# Patient Record
Sex: MALE | Race: WHITE | NOT HISPANIC OR LATINO
[De-identification: names, ages, dates, MRNs, and addresses within clinical notes are randomized per-mention and may not be internally consistent; named-entity substitution may affect disease eponyms.]

---

## 2017-03-03 ENCOUNTER — PRIOR ORIGINAL RECORDS (OUTPATIENT)
Dept: OTHER | Age: 72
End: 2017-03-03

## 2017-03-03 ENCOUNTER — HOSPITAL ENCOUNTER (OUTPATIENT)
Dept: LAB | Facility: HOSPITAL | Age: 72
Discharge: HOME | End: 2017-03-03
Attending: INTERNAL MEDICINE
Payer: MEDICARE

## 2017-03-03 LAB
ALBUMIN SERPL-MCNC: 3.6 G/DL (ref 3.5–4.8)
ALP LIVER SERPL-CCNC: 75 U/L (ref 45–117)
ALT SERPL-CCNC: 19 U/L (ref 17–63)
AST SERPL-CCNC: 17 U/L (ref 15–41)
BILIRUB SERPL-MCNC: 0.9 MG/DL (ref 0.1–2)
BUN BLD-MCNC: 18 MG/DL (ref 8–20)
CALCIUM BLD-MCNC: 9.2 MG/DL (ref 8.3–10.3)
CHLORIDE: 106 MMOL/L (ref 101–111)
CHOLEST SMN-MCNC: 149 MG/DL (ref ?–200)
CO2: 30 MMOL/L (ref 22–32)
CREAT BLD-MCNC: 1.05 MG/DL (ref 0.7–1.3)
GLUCOSE BLD-MCNC: 85 MG/DL (ref 70–99)
HDLC SERPL-MCNC: 74 MG/DL (ref 45–?)
HDLC SERPL: 2.01 {RATIO} (ref ?–4.97)
LDLC SERPL CALC-MCNC: 53 MG/DL (ref ?–130)
M PROTEIN MFR SERPL ELPH: 6.8 G/DL (ref 6.1–8.3)
NONHDLC SERPL-MCNC: 75 MG/DL (ref ?–130)
POTASSIUM SERPL-SCNC: 4.3 MMOL/L (ref 3.6–5.1)
SODIUM SERPL-SCNC: 143 MMOL/L (ref 136–144)
TRIGLYCERIDES: 110 MG/DL (ref ?–150)
VLDL: 22 MG/DL (ref 5–40)

## 2017-03-03 PROCEDURE — 80053 COMPREHEN METABOLIC PANEL: CPT | Performed by: INTERNAL MEDICINE

## 2017-03-03 PROCEDURE — 36415 COLL VENOUS BLD VENIPUNCTURE: CPT | Performed by: INTERNAL MEDICINE

## 2017-03-03 PROCEDURE — 80061 LIPID PANEL: CPT | Performed by: INTERNAL MEDICINE

## 2017-03-07 LAB
ALBUMIN: 3.6 G/DL
ALKALINE PHOSPHATATE(ALK PHOS): 75 IU/L
BILIRUBIN TOTAL: 0.9 MG/DL
BUN: 18 MG/DL
CALCIUM: 9.2 MG/DL
CHLORIDE: 106 MEQ/L
CHOLESTEROL, TOTAL: 149 MG/DL
CREATININE, SERUM: 1.05 MG/DL
GLUCOSE: 85 MG/DL
HDL CHOLESTEROL: 74 MG/DL
LDL CHOLESTEROL: 53 MG/DL
POTASSIUM, SERUM: 4.3 MEQ/L
PROTEIN, TOTAL: 6.8 G/DL
SGOT (AST): 17 IU/L
SGPT (ALT): 19 IU/L
SODIUM: 143 MEQ/L
TRIGLYCERIDES: 110 MG/DL

## 2017-03-08 ENCOUNTER — MYAURORA ACCOUNT LINK (OUTPATIENT)
Dept: OTHER | Age: 72
End: 2017-03-08

## 2017-03-08 ENCOUNTER — PRIOR ORIGINAL RECORDS (OUTPATIENT)
Dept: OTHER | Age: 72
End: 2017-03-08

## 2018-02-20 PROCEDURE — 88305 TISSUE EXAM BY PATHOLOGIST: CPT | Performed by: INTERNAL MEDICINE

## 2018-03-08 ENCOUNTER — PRIOR ORIGINAL RECORDS (OUTPATIENT)
Dept: OTHER | Age: 73
End: 2018-03-08

## 2018-04-26 ENCOUNTER — PRIOR ORIGINAL RECORDS (OUTPATIENT)
Dept: OTHER | Age: 73
End: 2018-04-26

## 2018-04-26 ENCOUNTER — MYAURORA ACCOUNT LINK (OUTPATIENT)
Dept: OTHER | Age: 73
End: 2018-04-26

## 2018-05-07 LAB
ALBUMIN: 4.3 G/DL
ALKALINE PHOSPHATATE(ALK PHOS): 78 IU/L
BILIRUBIN TOTAL: 0.8 MG/DL
BUN: 20 MG/DL
CALCIUM: 9.5 MG/DL
CHLORIDE: 100 MEQ/L
CHOLESTEROL, TOTAL: 184 MG/DL
CREATININE, SERUM: 1 MG/DL
GLUCOSE: 95 MG/DL
HDL CHOLESTEROL: 65 MG/DL
LDL CHOLESTEROL: 99 MG/DL
POTASSIUM, SERUM: 4.8 MEQ/L
PROTEIN, TOTAL: 6.6 G/DL
SGOT (AST): 24 IU/L
SGPT (ALT): 18 IU/L
SODIUM: 140 MEQ/L
TRIGLYCERIDES: 102 MG/DL

## 2018-10-23 ENCOUNTER — HOSPITAL ENCOUNTER (OUTPATIENT)
Dept: LAB | Facility: HOSPITAL | Age: 73
Discharge: HOME | End: 2018-10-23
Attending: INTERNAL MEDICINE
Payer: MEDICARE

## 2018-10-23 ENCOUNTER — PRIOR ORIGINAL RECORDS (OUTPATIENT)
Dept: OTHER | Age: 73
End: 2018-10-23

## 2018-10-23 PROCEDURE — 80061 LIPID PANEL: CPT | Performed by: INTERNAL MEDICINE

## 2018-10-23 PROCEDURE — 36415 COLL VENOUS BLD VENIPUNCTURE: CPT | Performed by: INTERNAL MEDICINE

## 2018-10-24 ENCOUNTER — PRIOR ORIGINAL RECORDS (OUTPATIENT)
Dept: OTHER | Age: 73
End: 2018-10-24

## 2018-10-24 LAB
CHOLESTEROL, TOTAL: 164 MG/DL
HDL CHOLESTEROL: 62 MG/DL
LDL CHOLESTEROL: 80 MG/DL
TRIGLYCERIDES: 112 MG/DL

## 2019-02-28 VITALS
SYSTOLIC BLOOD PRESSURE: 118 MMHG | WEIGHT: 165 LBS | HEART RATE: 74 BPM | DIASTOLIC BLOOD PRESSURE: 68 MMHG | BODY MASS INDEX: 22.35 KG/M2 | HEIGHT: 72 IN

## 2019-03-01 VITALS
DIASTOLIC BLOOD PRESSURE: 62 MMHG | HEIGHT: 71 IN | SYSTOLIC BLOOD PRESSURE: 108 MMHG | WEIGHT: 166 LBS | HEART RATE: 72 BPM | BODY MASS INDEX: 23.24 KG/M2

## 2019-03-18 RX ORDER — ATORVASTATIN CALCIUM 40 MG/1
TABLET, FILM COATED ORAL
COMMUNITY
Start: 2018-04-26 | End: 2019-05-06 | Stop reason: SDUPTHER

## 2019-03-18 RX ORDER — PHENOL 1.4 %
1 AEROSOL, SPRAY (ML) MUCOUS MEMBRANE 2 TIMES DAILY
COMMUNITY
Start: 2012-04-04

## 2019-03-18 RX ORDER — ASPIRIN 325 MG
1 TABLET ORAL DAILY
COMMUNITY
Start: 2011-08-31

## 2019-03-25 ENCOUNTER — TELEPHONE (OUTPATIENT)
Dept: CARDIOLOGY | Age: 74
End: 2019-03-25

## 2019-03-25 DIAGNOSIS — E78.00 HYPERCHOLESTEREMIA: Primary | ICD-10-CM

## 2019-04-02 ENCOUNTER — HOSPITAL ENCOUNTER (OUTPATIENT)
Dept: LAB | Facility: HOSPITAL | Age: 74
Discharge: HOME | End: 2019-04-02
Attending: INTERNAL MEDICINE
Payer: MEDICARE

## 2019-04-02 LAB
CHOLEST SERPL-MCNC: 168 MG/DL
CHOLEST/HDLC SERPL: NORMAL {RATIO}
HDLC SERPL-MCNC: 66 MG/DL
LDLC SERPL CALC-MCNC: 78 MG/DL
LENGTH OF FAST TIME PATIENT: YES H
NONHDLC SERPL-MCNC: 102 MG/DL
TRIGL SERPL-MCNC: 120 MG/DL
VLDLC SERPL CALC-MCNC: 24 MG/DL

## 2019-04-02 PROCEDURE — 80053 COMPREHEN METABOLIC PANEL: CPT | Performed by: INTERNAL MEDICINE

## 2019-04-02 PROCEDURE — 80061 LIPID PANEL: CPT | Performed by: INTERNAL MEDICINE

## 2019-04-02 PROCEDURE — 36415 COLL VENOUS BLD VENIPUNCTURE: CPT | Performed by: INTERNAL MEDICINE

## 2019-04-03 ENCOUNTER — TELEPHONE (OUTPATIENT)
Dept: CARDIOLOGY | Age: 74
End: 2019-04-03

## 2019-04-03 DIAGNOSIS — E78.00 HYPERCHOLESTEREMIA: ICD-10-CM

## 2019-04-10 ENCOUNTER — OFFICE VISIT (OUTPATIENT)
Dept: CARDIOLOGY | Age: 74
End: 2019-04-10

## 2019-04-10 VITALS
HEART RATE: 64 BPM | HEIGHT: 71 IN | WEIGHT: 167 LBS | DIASTOLIC BLOOD PRESSURE: 78 MMHG | BODY MASS INDEX: 23.38 KG/M2 | SYSTOLIC BLOOD PRESSURE: 122 MMHG

## 2019-04-10 DIAGNOSIS — I48.3 TYPICAL ATRIAL FLUTTER (CMD): ICD-10-CM

## 2019-04-10 DIAGNOSIS — I25.10 CORONARY ARTERY CALCIFICATION SEEN ON CT SCAN: ICD-10-CM

## 2019-04-10 DIAGNOSIS — E78.00 PURE HYPERCHOLESTEROLEMIA: Primary | ICD-10-CM

## 2019-04-10 PROCEDURE — 99213 OFFICE O/P EST LOW 20 MIN: CPT | Performed by: INTERNAL MEDICINE

## 2019-04-10 SDOH — HEALTH STABILITY: PHYSICAL HEALTH: ON AVERAGE, HOW MANY DAYS PER WEEK DO YOU ENGAGE IN MODERATE TO STRENUOUS EXERCISE (LIKE A BRISK WALK)?: 1 DAY

## 2019-04-10 ASSESSMENT — ENCOUNTER SYMPTOMS
WEIGHT GAIN: 0
BRUISES/BLEEDS EASILY: 0
COUGH: 0
HEMOPTYSIS: 0
ALLERGIC/IMMUNOLOGIC COMMENTS: NO NEW FOOD ALLERGIES
FEVER: 0
CHILLS: 0
SUSPICIOUS LESIONS: 0
HEMATOCHEZIA: 0
WEIGHT LOSS: 0

## 2019-05-08 RX ORDER — ATORVASTATIN CALCIUM 40 MG/1
TABLET, FILM COATED ORAL
Qty: 90 TABLET | Refills: 3 | Status: SHIPPED | OUTPATIENT
Start: 2019-05-08 | End: 2020-05-05

## 2020-03-25 ENCOUNTER — TELEPHONE (OUTPATIENT)
Dept: CARDIOLOGY | Age: 75
End: 2020-03-25

## 2020-03-25 DIAGNOSIS — I47.10 SVT (SUPRAVENTRICULAR TACHYCARDIA): ICD-10-CM

## 2020-03-25 DIAGNOSIS — I48.3 TYPICAL ATRIAL FLUTTER (CMD): Primary | ICD-10-CM

## 2020-03-25 DIAGNOSIS — R00.2 PALPITATIONS: ICD-10-CM

## 2020-04-02 ENCOUNTER — CLINICAL ABSTRACT (OUTPATIENT)
Dept: CARDIOLOGY | Age: 75
End: 2020-04-02

## 2020-04-03 ENCOUNTER — ANCILLARY PROCEDURE (OUTPATIENT)
Dept: CARDIOLOGY | Age: 75
End: 2020-04-03
Attending: INTERNAL MEDICINE

## 2020-04-03 ENCOUNTER — OFFICE VISIT (OUTPATIENT)
Dept: CARDIOLOGY | Age: 75
End: 2020-04-03

## 2020-04-03 VITALS — WEIGHT: 164 LBS | BODY MASS INDEX: 22.21 KG/M2 | HEIGHT: 72 IN

## 2020-04-03 DIAGNOSIS — I25.10 CORONARY ARTERY CALCIFICATION SEEN ON CT SCAN: ICD-10-CM

## 2020-04-03 DIAGNOSIS — Z98.890 HISTORY OF CARDIAC RADIOFREQUENCY ABLATION (RFA): Chronic | ICD-10-CM

## 2020-04-03 DIAGNOSIS — E78.00 PURE HYPERCHOLESTEROLEMIA: ICD-10-CM

## 2020-04-03 DIAGNOSIS — I48.3 TYPICAL ATRIAL FLUTTER (CMD): ICD-10-CM

## 2020-04-03 DIAGNOSIS — R00.2 PALPITATIONS: ICD-10-CM

## 2020-04-03 DIAGNOSIS — Z98.890 HISTORY OF CARDIAC RADIOFREQUENCY ABLATION (RFA): ICD-10-CM

## 2020-04-03 DIAGNOSIS — R00.2 PALPITATIONS: Primary | ICD-10-CM

## 2020-04-03 PROCEDURE — 93268 ECG RECORD/REVIEW: CPT | Performed by: INTERNAL MEDICINE

## 2020-04-03 PROCEDURE — 99442 TELEPHONE E&M BY PHYSICIAN EST PT NOT ORIG PREV 7 DAYS 11-20 MIN: CPT | Performed by: INTERNAL MEDICINE

## 2020-04-03 SDOH — HEALTH STABILITY: PHYSICAL HEALTH: ON AVERAGE, HOW MANY DAYS PER WEEK DO YOU ENGAGE IN MODERATE TO STRENUOUS EXERCISE (LIKE A BRISK WALK)?: 3 DAYS

## 2020-04-03 SDOH — HEALTH STABILITY: PHYSICAL HEALTH: ON AVERAGE, HOW MANY MINUTES DO YOU ENGAGE IN EXERCISE AT THIS LEVEL?: 30 MIN

## 2020-04-03 ASSESSMENT — PATIENT HEALTH QUESTIONNAIRE - PHQ9
1. LITTLE INTEREST OR PLEASURE IN DOING THINGS: NOT AT ALL
SUM OF ALL RESPONSES TO PHQ9 QUESTIONS 1 AND 2: 0
1. LITTLE INTEREST OR PLEASURE IN DOING THINGS: NOT AT ALL
2. FEELING DOWN, DEPRESSED OR HOPELESS: NOT AT ALL
2. FEELING DOWN, DEPRESSED OR HOPELESS: NOT AT ALL

## 2020-04-08 ENCOUNTER — APPOINTMENT (OUTPATIENT)
Dept: CARDIOLOGY | Age: 75
End: 2020-04-08

## 2020-05-05 RX ORDER — ATORVASTATIN CALCIUM 40 MG/1
TABLET, FILM COATED ORAL
Qty: 90 TABLET | Refills: 3 | Status: SHIPPED | OUTPATIENT
Start: 2020-05-05

## 2020-05-20 ENCOUNTER — TELEPHONE (OUTPATIENT)
Dept: CARDIOLOGY | Age: 75
End: 2020-05-20

## 2020-06-08 ENCOUNTER — LAB ENCOUNTER (OUTPATIENT)
Dept: LAB | Facility: HOSPITAL | Age: 75
End: 2020-06-08
Attending: INTERNAL MEDICINE
Payer: MEDICARE

## 2020-06-08 DIAGNOSIS — I25.10 CORONARY ARTERY CALCIFICATION SEEN ON CT SCAN: Primary | ICD-10-CM

## 2020-06-08 DIAGNOSIS — E78.00 PURE HYPERCHOLESTEROLEMIA: ICD-10-CM

## 2020-06-08 LAB
ALBUMIN SERPL-MCNC: 3.7 G/DL
ALBUMIN/GLOB SERPL: 1.2 {RATIO}
ALP SERPL-CCNC: 73 U/L
ALT SERPL-CCNC: 26 U/L
ANION GAP SERPL CALC-SCNC: 4 MMOL/L
AST SERPL-CCNC: 28 U/L
BILIRUB SERPL-MCNC: 1.2 MG/DL
BUN SERPL-MCNC: 22 MG/DL
BUN/CREAT SERPL: 21.4
CALCIUM SERPL-MCNC: 8.8 MG/DL
CHLORIDE SERPL-SCNC: 106 MMOL/L
CHOLEST SERPL-MCNC: 141 MG/DL
CO2 SERPL-SCNC: 29 MMOL/L
CREAT SERPL-MCNC: 1.03 MG/DL
GLOBULIN SER-MCNC: 3.1 G/DL
GLUCOSE SERPL-MCNC: 82 MG/DL
HDLC SERPL-MCNC: 62 MG/DL
LDLC SERPL CALC-MCNC: 64 MG/DL
LENGTH OF FAST TIME PATIENT: YES H
LENGTH OF FAST TIME PATIENT: YES H
NONHDLC SERPL-MCNC: 79 MG/DL
POTASSIUM SERPL-SCNC: 4.6 MMOL/L
PROT SERPL-MCNC: 6.8 G/DL
SODIUM SERPL-SCNC: 139 MMOL/L
TRIGL SERPL-MCNC: 77 MG/DL
VLDLC SERPL CALC-MCNC: 15 MG/DL

## 2020-06-08 PROCEDURE — 80053 COMPREHEN METABOLIC PANEL: CPT

## 2020-06-08 PROCEDURE — 36415 COLL VENOUS BLD VENIPUNCTURE: CPT

## 2020-06-08 PROCEDURE — 80061 LIPID PANEL: CPT

## 2020-06-09 ENCOUNTER — CLINICAL ABSTRACT (OUTPATIENT)
Dept: CARDIOLOGY | Age: 75
End: 2020-06-09

## 2020-06-09 ENCOUNTER — TELEPHONE (OUTPATIENT)
Dept: CARDIOLOGY | Age: 75
End: 2020-06-09

## 2021-04-19 ENCOUNTER — OFFICE VISIT (OUTPATIENT)
Dept: CARDIOLOGY | Age: 76
End: 2021-04-19

## 2021-04-19 ENCOUNTER — APPOINTMENT (OUTPATIENT)
Dept: CARDIOLOGY | Age: 76
End: 2021-04-19

## 2021-04-19 VITALS
WEIGHT: 161 LBS | DIASTOLIC BLOOD PRESSURE: 70 MMHG | BODY MASS INDEX: 21.81 KG/M2 | HEART RATE: 64 BPM | HEIGHT: 72 IN | SYSTOLIC BLOOD PRESSURE: 126 MMHG

## 2021-04-19 DIAGNOSIS — I25.10 CORONARY ARTERY CALCIFICATION SEEN ON CT SCAN: ICD-10-CM

## 2021-04-19 DIAGNOSIS — Z98.890 HISTORY OF CARDIAC RADIOFREQUENCY ABLATION (RFA): Chronic | ICD-10-CM

## 2021-04-19 DIAGNOSIS — E78.00 PURE HYPERCHOLESTEROLEMIA: Primary | ICD-10-CM

## 2021-04-19 PROCEDURE — 99214 OFFICE O/P EST MOD 30 MIN: CPT | Performed by: INTERNAL MEDICINE

## 2021-04-19 ASSESSMENT — ENCOUNTER SYMPTOMS
HEMOPTYSIS: 0
WEIGHT LOSS: 0
BRUISES/BLEEDS EASILY: 0
HEMATOCHEZIA: 0
SUSPICIOUS LESIONS: 0
ALLERGIC/IMMUNOLOGIC COMMENTS: NO NEW FOOD ALLERGIES
CHILLS: 0
COUGH: 0
WEIGHT GAIN: 0
FEVER: 0

## 2021-04-19 ASSESSMENT — PATIENT HEALTH QUESTIONNAIRE - PHQ9
CLINICAL INTERPRETATION OF PHQ9 SCORE: NO FURTHER SCREENING NEEDED
SUM OF ALL RESPONSES TO PHQ9 QUESTIONS 1 AND 2: 0
CLINICAL INTERPRETATION OF PHQ2 SCORE: NO FURTHER SCREENING NEEDED
SUM OF ALL RESPONSES TO PHQ9 QUESTIONS 1 AND 2: 0
1. LITTLE INTEREST OR PLEASURE IN DOING THINGS: NOT AT ALL
2. FEELING DOWN, DEPRESSED OR HOPELESS: NOT AT ALL

## 2021-05-28 ENCOUNTER — TELEPHONE (OUTPATIENT)
Dept: CARDIOLOGY | Age: 76
End: 2021-05-28

## 2022-04-08 PROBLEM — C44.90 MALIGNANT NEOPLASM OF SKIN: Status: ACTIVE | Noted: 2022-04-08

## 2022-04-08 PROBLEM — I48.91 ATRIAL FIBRILLATION (HCC): Status: ACTIVE | Noted: 2022-04-08

## 2022-04-08 PROBLEM — C61 MALIGNANT TUMOR OF PROSTATE (HCC): Status: ACTIVE | Noted: 2022-04-08

## 2023-03-27 ENCOUNTER — TELEPHONE (OUTPATIENT)
Dept: ORTHOPEDICS CLINIC | Facility: CLINIC | Age: 78
End: 2023-03-27

## 2023-03-27 DIAGNOSIS — R52 PAIN: Primary | ICD-10-CM

## 2023-03-27 NOTE — TELEPHONE ENCOUNTER
Future Appointments   Date Time Provider Ashley Norman   4/12/2023 12:45 PM [facility] XR IC RM1 [facility] RAD [facility] [place]   4/12/2023  1:00 PM Misael RODRÍGUEZ PA-C [facility] [place]       Patient is scheduled for xray and advised to complete prior to appt

## 2023-03-27 NOTE — TELEPHONE ENCOUNTER
Patient is coming in for LT SHOULDER . At this time patient has not had any imaging done. Please place X-ray order accordingly, I have notified the patient to  come in earlier prior to appointment to have imaging done. Please forward to Elia Stevenson and help schedule xrays. Thank you.     Future Appointments   Date Time Provider Ashley Norman   4/12/2023  1:00 PM Paola RODRÍGUEZ PA-C [facility] [place]

## 2023-04-12 ENCOUNTER — HOSPITAL ENCOUNTER (OUTPATIENT)
Dept: GENERAL RADIOLOGY | Age: 78
Discharge: HOME | End: 2023-04-12
Attending: PHYSICIAN ASSISTANT
Payer: MEDICARE

## 2023-04-12 ENCOUNTER — OFFICE VISIT (OUTPATIENT)
Dept: ORTHOPEDICS CLINIC | Facility: CLINIC | Age: 78
End: 2023-04-12
Payer: MEDICARE

## 2023-04-12 VITALS — WEIGHT: 162 LBS | HEIGHT: 72 IN | BODY MASS INDEX: 21.94 KG/M2

## 2023-04-12 DIAGNOSIS — S46.012A ROTATOR CUFF STRAIN, LEFT, INITIAL ENCOUNTER: Primary | ICD-10-CM

## 2023-04-12 DIAGNOSIS — R52 PAIN: ICD-10-CM

## 2023-04-12 PROCEDURE — 73030 X-RAY EXAM OF SHOULDER: CPT | Performed by: PHYSICIAN ASSISTANT

## 2023-04-12 PROCEDURE — 1125F AMNT PAIN NOTED PAIN PRSNT: CPT | Performed by: PHYSICIAN ASSISTANT

## 2023-04-12 PROCEDURE — 3008F BODY MASS INDEX DOCD: CPT | Performed by: PHYSICIAN ASSISTANT

## 2023-04-12 PROCEDURE — 99204 OFFICE O/P NEW MOD 45 MIN: CPT | Performed by: PHYSICIAN ASSISTANT

## 2023-04-12 RX ORDER — IBUPROFEN 200 MG
200 TABLET ORAL EVERY 6 HOURS PRN
COMMUNITY

## 2023-04-21 ENCOUNTER — HOSPITAL ENCOUNTER (OUTPATIENT)
Dept: MRI IMAGING | Age: 78
Discharge: HOME | End: 2023-04-21
Attending: PHYSICIAN ASSISTANT
Payer: MEDICARE

## 2023-04-21 DIAGNOSIS — S46.012A ROTATOR CUFF STRAIN, LEFT, INITIAL ENCOUNTER: ICD-10-CM

## 2023-04-21 PROCEDURE — 73221 MRI JOINT UPR EXTREM W/O DYE: CPT | Performed by: PHYSICIAN ASSISTANT

## 2023-04-26 ENCOUNTER — TELEPHONE (OUTPATIENT)
Dept: ORTHOPEDICS CLINIC | Facility: CLINIC | Age: 78
End: 2023-04-26

## 2023-04-26 NOTE — TELEPHONE ENCOUNTER
Called patient and explained that he should meet with Dr. Dillan Alexander to discuss MRI results and next steps including surgical intervention. We will move him from my schedule today to Dr. Brigida John schedule next week.

## 2023-05-01 ENCOUNTER — OFFICE VISIT (OUTPATIENT)
Dept: ORTHOPEDICS CLINIC | Facility: CLINIC | Age: 78
End: 2023-05-01
Payer: MEDICARE

## 2023-05-01 ENCOUNTER — TELEPHONE (OUTPATIENT)
Dept: ORTHOPEDICS CLINIC | Facility: CLINIC | Age: 78
End: 2023-05-01

## 2023-05-01 VITALS — WEIGHT: 162 LBS | HEIGHT: 72 IN | BODY MASS INDEX: 21.94 KG/M2

## 2023-05-01 DIAGNOSIS — M67.814 BICEPS TENDINOSIS OF LEFT SHOULDER: ICD-10-CM

## 2023-05-01 DIAGNOSIS — S46.012A TRAUMATIC COMPLETE TEAR OF LEFT ROTATOR CUFF, INITIAL ENCOUNTER: Primary | ICD-10-CM

## 2023-05-01 DIAGNOSIS — M75.42 IMPINGEMENT SYNDROME OF LEFT SHOULDER: ICD-10-CM

## 2023-05-01 DIAGNOSIS — M19.012 ARTHRITIS OF LEFT ACROMIOCLAVICULAR JOINT: ICD-10-CM

## 2023-05-01 PROCEDURE — 3008F BODY MASS INDEX DOCD: CPT | Performed by: ORTHOPAEDIC SURGERY

## 2023-05-01 PROCEDURE — 99205 OFFICE O/P NEW HI 60 MIN: CPT | Performed by: ORTHOPAEDIC SURGERY

## 2023-05-01 PROCEDURE — 1125F AMNT PAIN NOTED PAIN PRSNT: CPT | Performed by: ORTHOPAEDIC SURGERY

## 2023-05-02 ENCOUNTER — TELEPHONE (OUTPATIENT)
Dept: ORTHOPEDICS CLINIC | Facility: CLINIC | Age: 78
End: 2023-05-02

## 2023-05-02 DIAGNOSIS — M75.42 IMPINGEMENT SYNDROME OF LEFT SHOULDER: ICD-10-CM

## 2023-05-02 DIAGNOSIS — S46.012A TRAUMATIC COMPLETE TEAR OF LEFT ROTATOR CUFF, INITIAL ENCOUNTER: Primary | ICD-10-CM

## 2023-05-02 DIAGNOSIS — M19.012 ARTHRITIS OF LEFT ACROMIOCLAVICULAR JOINT: ICD-10-CM

## 2023-05-02 DIAGNOSIS — M67.814 BICEPS TENDINOSIS OF LEFT SHOULDER: ICD-10-CM

## 2023-05-02 NOTE — TELEPHONE ENCOUNTER
Date of Surgery: 6/6/23       Post Op Appt: 6/15/23 @ 0120    Case ID: [id number]    Notes: wants to know if his post op date can be pushed back.

## 2023-05-04 NOTE — TELEPHONE ENCOUNTER
SURGERY SCHEDULING SHEET    [facility]  5/29/1945  [id number]    Procedure: Left Shoulder Arthroscopy, Rotator Cuff Repair (66230), Subacromial Decompression (22700) and Distal Clavicle Excision (62011)   Left shoulder arthroscopy with rotator cuff repair possible Regeneten bio inductive graft, subacromial decompression, debridement, possible distal claviculectomy     Diagnosis:  Traumatic complete tear of left rotator cuff, initial encounter S46.012A     Arthritis of left acromioclavicular joint M19.012     Impingement syndrome of left shoulder M75.42     Biceps tendinosis of left shoulder M67.814      Anesthesia: General    Length of Surgery: 3 hrs    Disposition: Outpatient    Special Equipment: Arthrex and Smith and Nephew     Positioning:    Assist: Yes SK PA-C     Pre-op Testing: PER ANESTHESIA GUIDELINES    Clearance: HISTORY AND PHYSICAL     Post op: 7-10 days post op    Frantz Kaufman MD  [address] Orthopedic Surgery  Phone: [phone]  Fax: [phone]

## 2023-05-23 ENCOUNTER — EKG ENCOUNTER (OUTPATIENT)
Dept: LAB | Facility: HOSPITAL | Age: 78
End: 2023-05-23
Attending: ORTHOPAEDIC SURGERY
Payer: MEDICARE

## 2023-05-23 DIAGNOSIS — Z01.818 PRE-OP TESTING: ICD-10-CM

## 2023-05-23 LAB
ATRIAL RATE: 68 BPM
P AXIS: 79 DEGREES
P-R INTERVAL: 174 MS
Q-T INTERVAL: 380 MS
QRS DURATION: 68 MS
QTC CALCULATION (BEZET): 404 MS
R AXIS: 4 DEGREES
T AXIS: 41 DEGREES
VENTRICULAR RATE: 68 BPM

## 2023-05-23 PROCEDURE — 93010 ELECTROCARDIOGRAM REPORT: CPT | Performed by: INTERNAL MEDICINE

## 2023-05-23 PROCEDURE — 93005 ELECTROCARDIOGRAM TRACING: CPT

## 2023-05-30 ENCOUNTER — ANESTHESIA EVENT (OUTPATIENT)
Dept: SURGERY | Facility: HOSPITAL | Age: 78
End: 2023-05-30
Payer: MEDICARE

## 2023-06-06 ENCOUNTER — ANESTHESIA (OUTPATIENT)
Dept: SURGERY | Facility: HOSPITAL | Age: 78
End: 2023-06-06
Payer: MEDICARE

## 2023-06-06 ENCOUNTER — HOSPITAL ENCOUNTER (OUTPATIENT)
Facility: HOSPITAL | Age: 78
Setting detail: HOSPITAL OUTPATIENT SURGERY
Discharge: HOME | End: 2023-06-06
Attending: ORTHOPAEDIC SURGERY | Admitting: ORTHOPAEDIC SURGERY
Payer: MEDICARE

## 2023-06-06 VITALS
SYSTOLIC BLOOD PRESSURE: 123 MMHG | OXYGEN SATURATION: 94 % | WEIGHT: 163.19 LBS | HEIGHT: 72 IN | DIASTOLIC BLOOD PRESSURE: 77 MMHG | BODY MASS INDEX: 22.1 KG/M2 | RESPIRATION RATE: 16 BRPM | TEMPERATURE: 98 F | HEART RATE: 73 BPM

## 2023-06-06 DIAGNOSIS — Z01.818 PRE-OP TESTING: Primary | ICD-10-CM

## 2023-06-06 PROCEDURE — 76942 ECHO GUIDE FOR BIOPSY: CPT | Performed by: ANESTHESIOLOGY

## 2023-06-06 PROCEDURE — 0RNK4ZZ RELEASE LEFT SHOULDER JOINT, PERCUTANEOUS ENDOSCOPIC APPROACH: ICD-10-PCS | Performed by: ORTHOPAEDIC SURGERY

## 2023-06-06 PROCEDURE — 0RQK4ZZ REPAIR LEFT SHOULDER JOINT, PERCUTANEOUS ENDOSCOPIC APPROACH: ICD-10-PCS | Performed by: ORTHOPAEDIC SURGERY

## 2023-06-06 PROCEDURE — 0LU24JZ SUPPLEMENT LEFT SHOULDER TENDON WITH SYNTHETIC SUBSTITUTE, PERCUTANEOUS ENDOSCOPIC APPROACH: ICD-10-PCS | Performed by: ORTHOPAEDIC SURGERY

## 2023-06-06 PROCEDURE — 0LQ24ZZ REPAIR LEFT SHOULDER TENDON, PERCUTANEOUS ENDOSCOPIC APPROACH: ICD-10-PCS | Performed by: ORTHOPAEDIC SURGERY

## 2023-06-06 PROCEDURE — 0PBB4ZZ EXCISION OF LEFT CLAVICLE, PERCUTANEOUS ENDOSCOPIC APPROACH: ICD-10-PCS | Performed by: ORTHOPAEDIC SURGERY

## 2023-06-06 DEVICE — SUTURE ANCHOR, PUSHLOCK 3.5 X 19.5MM
Type: IMPLANTABLE DEVICE | Site: SHOULDER | Status: FUNCTIONAL
Brand: ARTHREX®

## 2023-06-06 DEVICE — CORKSCREW FT, PEEK, SUTURETAPE, 4.75M
Type: IMPLANTABLE DEVICE | Site: SHOULDER | Status: FUNCTIONAL
Brand: ARTHREX®

## 2023-06-06 DEVICE — IMPLANTABLE DEVICE
Type: IMPLANTABLE DEVICE | Site: SHOULDER | Status: FUNCTIONAL
Brand: BIOINDUCTIVE IMPLANT WITH ARTHROSCOPIC DELIVERY SYSTEM - MEDIUM

## 2023-06-06 DEVICE — BONE ANCHORS 3 WITH ARTHROSCOPIC DELIVERY SYSTEM ADVANCED
Type: IMPLANTABLE DEVICE | Site: SHOULDER | Status: FUNCTIONAL
Brand: BONE ANCHORS WITH ARTHROSCOPIC DELIVERY SYSTEM - ADVANCED

## 2023-06-06 DEVICE — IMPLANTABLE DEVICE: Type: IMPLANTABLE DEVICE | Site: SHOULDER | Status: FUNCTIONAL

## 2023-06-06 RX ORDER — METOCLOPRAMIDE HYDROCHLORIDE 5 MG/ML
INJECTION INTRAMUSCULAR; INTRAVENOUS AS NEEDED
Status: DISCONTINUED | OUTPATIENT
Start: 2023-06-06 | End: 2023-06-06 | Stop reason: SURG

## 2023-06-06 RX ORDER — SODIUM CHLORIDE, SODIUM LACTATE, POTASSIUM CHLORIDE, CALCIUM CHLORIDE 600; 310; 30; 20 MG/100ML; MG/100ML; MG/100ML; MG/100ML
INJECTION, SOLUTION INTRAVENOUS CONTINUOUS
Status: DISCONTINUED | OUTPATIENT
Start: 2023-06-06 | End: 2023-06-06

## 2023-06-06 RX ORDER — TRANEXAMIC ACID 10 MG/ML
INJECTION, SOLUTION INTRAVENOUS AS NEEDED
Status: DISCONTINUED | OUTPATIENT
Start: 2023-06-06 | End: 2023-06-06 | Stop reason: SURG

## 2023-06-06 RX ORDER — HYDROMORPHONE HYDROCHLORIDE 1 MG/ML
0.4 INJECTION, SOLUTION INTRAMUSCULAR; INTRAVENOUS; SUBCUTANEOUS EVERY 5 MIN PRN
Status: DISCONTINUED | OUTPATIENT
Start: 2023-06-06 | End: 2023-06-06

## 2023-06-06 RX ORDER — MEPERIDINE HYDROCHLORIDE 25 MG/ML
25 INJECTION INTRAMUSCULAR; INTRAVENOUS; SUBCUTANEOUS
Status: DISCONTINUED | OUTPATIENT
Start: 2023-06-06 | End: 2023-06-06

## 2023-06-06 RX ORDER — POVIDONE-IODINE 10 MG/G
OINTMENT TOPICAL AS NEEDED
Status: DISCONTINUED | OUTPATIENT
Start: 2023-06-06 | End: 2023-06-06 | Stop reason: HOSPADM

## 2023-06-06 RX ORDER — MIDAZOLAM HYDROCHLORIDE 1 MG/ML
INJECTION INTRAMUSCULAR; INTRAVENOUS AS NEEDED
Status: DISCONTINUED | OUTPATIENT
Start: 2023-06-06 | End: 2023-06-06 | Stop reason: SURG

## 2023-06-06 RX ORDER — NALOXONE HYDROCHLORIDE 0.4 MG/ML
80 INJECTION, SOLUTION INTRAMUSCULAR; INTRAVENOUS; SUBCUTANEOUS AS NEEDED
Status: DISCONTINUED | OUTPATIENT
Start: 2023-06-06 | End: 2023-06-06

## 2023-06-06 RX ORDER — ACETAMINOPHEN 500 MG
1000 TABLET ORAL ONCE AS NEEDED
Status: DISCONTINUED | OUTPATIENT
Start: 2023-06-06 | End: 2023-06-06

## 2023-06-06 RX ORDER — HYDROMORPHONE HYDROCHLORIDE 1 MG/ML
0.2 INJECTION, SOLUTION INTRAMUSCULAR; INTRAVENOUS; SUBCUTANEOUS EVERY 5 MIN PRN
Status: DISCONTINUED | OUTPATIENT
Start: 2023-06-06 | End: 2023-06-06

## 2023-06-06 RX ORDER — GLYCOPYRROLATE 0.2 MG/ML
INJECTION, SOLUTION INTRAMUSCULAR; INTRAVENOUS AS NEEDED
Status: DISCONTINUED | OUTPATIENT
Start: 2023-06-06 | End: 2023-06-06 | Stop reason: SURG

## 2023-06-06 RX ORDER — ONDANSETRON 2 MG/ML
4 INJECTION INTRAMUSCULAR; INTRAVENOUS EVERY 6 HOURS PRN
Status: DISCONTINUED | OUTPATIENT
Start: 2023-06-06 | End: 2023-06-06

## 2023-06-06 RX ORDER — ACETAMINOPHEN 500 MG
1000 TABLET ORAL ONCE
Status: DISCONTINUED | OUTPATIENT
Start: 2023-06-06 | End: 2023-06-06 | Stop reason: HOSPADM

## 2023-06-06 RX ORDER — DEXAMETHASONE SODIUM PHOSPHATE 10 MG/ML
INJECTION, SOLUTION INTRAMUSCULAR; INTRAVENOUS AS NEEDED
Status: DISCONTINUED | OUTPATIENT
Start: 2023-06-06 | End: 2023-06-06 | Stop reason: SURG

## 2023-06-06 RX ORDER — LIDOCAINE HYDROCHLORIDE 10 MG/ML
INJECTION, SOLUTION EPIDURAL; INFILTRATION; INTRACAUDAL; PERINEURAL AS NEEDED
Status: DISCONTINUED | OUTPATIENT
Start: 2023-06-06 | End: 2023-06-06 | Stop reason: SURG

## 2023-06-06 RX ORDER — HYDROCODONE BITARTRATE AND ACETAMINOPHEN 5; 325 MG/1; MG/1
1 TABLET ORAL EVERY 4 HOURS PRN
Qty: 30 TABLET | Refills: 0 | Status: SHIPPED | OUTPATIENT
Start: 2023-06-06

## 2023-06-06 RX ORDER — METOCLOPRAMIDE HYDROCHLORIDE 5 MG/ML
10 INJECTION INTRAMUSCULAR; INTRAVENOUS EVERY 8 HOURS PRN
Status: DISCONTINUED | OUTPATIENT
Start: 2023-06-06 | End: 2023-06-06

## 2023-06-06 RX ORDER — MIDAZOLAM HYDROCHLORIDE 1 MG/ML
1 INJECTION INTRAMUSCULAR; INTRAVENOUS EVERY 5 MIN PRN
Status: DISCONTINUED | OUTPATIENT
Start: 2023-06-06 | End: 2023-06-06

## 2023-06-06 RX ORDER — BUPRENORPHINE HYDROCHLORIDE 0.32 MG/ML
INJECTION INTRAMUSCULAR; INTRAVENOUS AS NEEDED
Status: DISCONTINUED | OUTPATIENT
Start: 2023-06-06 | End: 2023-06-06 | Stop reason: SURG

## 2023-06-06 RX ORDER — HYDROMORPHONE HYDROCHLORIDE 1 MG/ML
0.6 INJECTION, SOLUTION INTRAMUSCULAR; INTRAVENOUS; SUBCUTANEOUS EVERY 5 MIN PRN
Status: DISCONTINUED | OUTPATIENT
Start: 2023-06-06 | End: 2023-06-06

## 2023-06-06 RX ORDER — DEXAMETHASONE SODIUM PHOSPHATE 4 MG/ML
VIAL (ML) INJECTION AS NEEDED
Status: DISCONTINUED | OUTPATIENT
Start: 2023-06-06 | End: 2023-06-06 | Stop reason: SURG

## 2023-06-06 RX ORDER — HYDROCODONE BITARTRATE AND ACETAMINOPHEN 5; 325 MG/1; MG/1
2 TABLET ORAL ONCE AS NEEDED
Status: DISCONTINUED | OUTPATIENT
Start: 2023-06-06 | End: 2023-06-06

## 2023-06-06 RX ORDER — CEFAZOLIN SODIUM/WATER 2 G/20 ML
2 SYRINGE (ML) INTRAVENOUS ONCE
Status: COMPLETED | OUTPATIENT
Start: 2023-06-06 | End: 2023-06-06

## 2023-06-06 RX ORDER — HYDROCODONE BITARTRATE AND ACETAMINOPHEN 5; 325 MG/1; MG/1
1 TABLET ORAL ONCE AS NEEDED
Status: DISCONTINUED | OUTPATIENT
Start: 2023-06-06 | End: 2023-06-06

## 2023-06-06 RX ADMIN — SODIUM CHLORIDE, SODIUM LACTATE, POTASSIUM CHLORIDE, CALCIUM CHLORIDE: 600; 310; 30; 20 INJECTION, SOLUTION INTRAVENOUS at 10:15:00

## 2023-06-06 RX ADMIN — METOCLOPRAMIDE HYDROCHLORIDE 10 MG: 5 INJECTION INTRAMUSCULAR; INTRAVENOUS at 10:05:00

## 2023-06-06 RX ADMIN — DEXAMETHASONE SODIUM PHOSPHATE 2 MG: 10 INJECTION, SOLUTION INTRAMUSCULAR; INTRAVENOUS at 07:07:00

## 2023-06-06 RX ADMIN — GLYCOPYRROLATE 0.4 MG: 0.2 INJECTION, SOLUTION INTRAMUSCULAR; INTRAVENOUS at 07:38:00

## 2023-06-06 RX ADMIN — CEFAZOLIN SODIUM/WATER 2 G: 2 G/20 ML SYRINGE (ML) INTRAVENOUS at 07:03:00

## 2023-06-06 RX ADMIN — TRANEXAMIC ACID 1000 MG: 10 INJECTION, SOLUTION INTRAVENOUS at 07:10:00

## 2023-06-06 RX ADMIN — DEXAMETHASONE SODIUM PHOSPHATE 4 MG: 4 MG/ML VIAL (ML) INJECTION at 10:05:00

## 2023-06-06 RX ADMIN — BUPRENORPHINE HYDROCHLORIDE 150 MCG: 0.32 INJECTION INTRAMUSCULAR; INTRAVENOUS at 07:07:00

## 2023-06-06 RX ADMIN — MIDAZOLAM HYDROCHLORIDE 2 MG: 1 INJECTION INTRAMUSCULAR; INTRAVENOUS at 07:02:00

## 2023-06-06 RX ADMIN — LIDOCAINE HYDROCHLORIDE 10 MG: 10 INJECTION, SOLUTION EPIDURAL; INFILTRATION; INTRACAUDAL; PERINEURAL at 07:08:00

## 2023-06-06 NOTE — ANESTHESIA POSTPROCEDURE EVALUATION
[address] [address] Patient Status:  Hospital Outpatient Surgery   Age/Gender [de-identified]year old male MRN [id number]   Location [address] Attending Chiara Beltran MD   Hosp Day # 0 PCP Lisbet Suarez MD       Anesthesia Post-op Note    LEFT SHOULDER ARTHROSCOPY ROTATOR CUFF REPAIR, REGENTEN BIO INDUCTIVE GRAFT, SUBACROMIAL DECOMPRESSION, DEBRIDEMENT, DISTAL CLAVICULECTOMY. Procedure Summary     Date: 06/06/23 Room / Location: [facility] MAIN OR [address] [place] [address] MAIN OR    Anesthesia Start: 0418 Anesthesia Stop:     Procedure: LEFT SHOULDER ARTHROSCOPY ROTATOR CUFF REPAIR, REGENTEN BIO INDUCTIVE GRAFT, SUBACROMIAL DECOMPRESSION, DEBRIDEMENT, DISTAL CLAVICULECTOMY. (Left) Diagnosis:       Traumatic complete tear of left rotator cuff, initial encounter      Arthritis of left acromioclavicular joint      Impingement syndrome of left shoulder      Biceps tendinosis of left shoulder      (Traumatic complete tear of left rotator cuff, initial encounter [S46.012A]Arthritis of left acromioclavicular joint [M19.012]Impingement syndrome of left shoulder [M75.42]Biceps tendinosis of left shoulder [A91.353])    Surgeons: Chiara Beltran MD Anesthesiologist: Radha Villalobos MD    Anesthesia Type: general ASA Status: 3          Anesthesia Type: general    Vitals Value Taken Time   /72 06/06/23 1019   Temp 98 06/06/23 1019   Pulse 74 06/06/23 1019   Resp 12 06/06/23 1019   SpO2 96 06/06/23 1019       Patient Location: PACU    Anesthesia Type: general    Airway Patency: patent    Postop Pain Control: adequate    Mental Status: mildly sedated but able to meaningfully participate in the post-anesthesia evaluation    Nausea/Vomiting: none    Cardiopulmonary/Hydration status: stable euvolemic    Complications: no apparent anesthesia related complications    Postop vital signs: stable    Dental Exam: Unchanged from Preop    Patient to be discharged from PACU when criteria met.

## 2023-06-06 NOTE — DISCHARGE INSTRUCTIONS
Post-operative Shoulder Arthroscopy    Medication: Before you leave the hospital, you will be given a prescription for a pain reliever. This medication contains a narcotic with acetaminophen (Tylenol), so do not take any additional Tylenol. You may take Tylenol or Ibuprofen instead of the prescription as the pain subsides. If you take a daily Aspirin you may resume taking your Aspirin the evening of surgery. Day of Surgery: When you return home, sleeping upright (such as in a recliner) may be more comfortable for the first few days. Place an ice bag over the dressing for about 20 minutes three or four times a day for the first 5 days while protecting the skin with a sterile dressing and towel. Dressings: The dressing should remain in place for 48 hours. After 48 hours, remove the foam tape and dressings. Apply a small amount of Betadine ointment to sutures and cover with a breathable band-aid. Showering: Before showering, apply waterproof band-aids to sutures to ensure these areas do not get wet. Once finished, remove band-aids, let area air dry, and apply another small amount of Betadine ointment and regular band-aids. Do not immerse or soak the surgical wound (no baths). When changing Band-Aids, look in the mirror and make sure your face or chin does not touch the incisions. Activity:  For shoulder arthroscopy: Flex and extend the hand, wrist, and elbow and begin pendulums as instructed for underarm hygiene and dressing. Follow up: You will be scheduled for a follow up office visit in 7-10 days to have your sutures taken out. Your plan for physical therapy, driving, and returning to work will be discussed at this appointment. Please don't hesitate to contact our office at [phone] if you have any post-operative questions or concerns.

## 2023-06-06 NOTE — BRIEF OP NOTE
Pre-Operative Diagnosis: Traumatic complete tear of left rotator cuff, initial encounter [S46.012A]  Arthritis of left acromioclavicular joint [M19.012]  Impingement syndrome of left shoulder [M75.42]  Biceps tendinosis of left shoulder [M67.814]     Post-Operative Diagnosis: Traumatic complete tear of left rotator cuff, initial encounter [S46.012A]Arthritis of left acromioclavicular joint [M19.012]Impingement syndrome of left shoulder [M75.42]Biceps tendinosis of left shoulder [M67.814] chondromalacia glenohumeral joint      Procedure Performed:   LEFT SHOULDER ARTHROSCOPY WITH ROTATOR CUFF REPAIR WITH COLLAGEN SCAFFOLD AUGMENTATION GRAFT, SUBACROMIAL DECOMPRESSION, DISTAL CLAVICLE EXCISION, AND EXTENSIVE DEBRIDEMENT LABRUM, BICEPS AND GREATER TUBEROSITY WITH CHONDROPLASTY GLENOID AND HUMERAL HEAD     Surgeon(s) and Role:     Geovanna Aguilar MD - Primary    Assistant(s):  PA: Gayatri Garcia PA-C     Surgical Findings: see operative report      Specimen: na     Estimated Blood Loss: Blood Output: 10 mL (6/6/2023 10:05 AM)      Jeremy Carrasco PA-C  6/6/2023  10:20 AM

## 2023-06-06 NOTE — OPERATIVE REPORT
[facility]    PATIENT'S NAME: Jose Ge   ATTENDING PHYSICIAN: Ivan Montana M.D. OPERATING PHYSICIAN: Ivan Montana M.D. PATIENT ACCOUNT#:   [de-identified]    LOCATION:  [place] [address]  MEDICAL RECORD #:   [id number]       YOB: 1945  ADMISSION DATE:       06/06/2023      OPERATION DATE:  06/06/2023    OPERATIVE REPORT      PREOPERATIVE DIAGNOSIS:    1. Left shoulder massive retracted tear of the rotator cuff. 2.   Subacromial impingement. 3.   Acromioclavicular joint arthrosis. 4.   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 POSTOPERATIVE DIAGNOSIS:    1. Left shoulder massive retracted tear of the rotator cuff. 2.   Subacromial impingement. 3.   Acromioclavicular joint arthrosis. 4.   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 PROCEDURE:    1. Left shoulder arthroscopy with repair of massive retracted rotator cuff tear augmented with bioinductive collagen graft. 2.   Subacromial decompression. 3.   Distal claviculectomy. 4.   Extensive debridement of superior labrum and biceps labral complex, posterior inferior labrum, glenohumeral articular cartilage, proximal humeral bone and tendon stump at the greater tuberosity, subdeltoid adhesions. ASSISTANT:  Paige Kohli PA-C. ANESTHESIA:  General with interscalene regional block. COMPLICATIONS:  None. DRAINS:  None. SPECIMENS:  None. CONDITION:  Stable. BLOOD LOSS:  25 mL. IMPLANTS:  Arthrex 4.75 mm PEEK Corkscrew FT triple loaded with suture tape x2, 3.5 mm PEEK PushLock x2. INDICATIONS:  The patient is a an 25-year-old active male who sustained an injury to his left shoulder while vacationing in [place]. He fell onto this left shoulder, after which he experienced significant pain, weakness, and inability to elevate the arm.   He was seen in orthopedic consultation after an MRI confirmed significant involvement of his rotator cuff. There was a massive retracted tear involving the entirety of the distal supraspinatus, infraspinatus with retraction to the medial humeral head. Biceps tendinosis, AC joint arthritic changes, and subdeltoid fluid and inflammation were identified. We had a long discussion regarding his treatment options given the MRI findings. In light of his fairly normal pre-injury level of function and active lifestyle, the patient elected to proceed with surgical repair with arthroscopy. Risks, benefits, and alternatives to surgery were discussed including, but not limited to, possible infection, bleeding, neurovascular injury as well as postoperative stiffness, continued pain, re-rupture of his repair and failed improvement following surgery. Risks of anesthesia were also reviewed including, but not limited to, possible cardiac, pulmonary, or cerebrovascular accidents, any of which could be very serious or life-threatening. Appropriate preoperative medical evaluation and clearance was provided through his primary care physician. The patient understood these risks and gave informed consent. OPERATIVE TECHNIQUE:  The patient was identified in the preoperative holding area, where the operative left upper extremity was clipped of excess hairs as needed and initialed by me. Informed consent was confirmed. The patient was taken to the operating room and placed supine on the operating table with a beanbag underneath. Regional block and general anesthesia were administered without complication. The patient was then carefully placed in the right lateral decubitus position with an axillary roll, pillows between the knees and all bony prominences well padded.   Beanbag was deflated and he was carefully prepped and draped in the usual sterile fashion with appropriate counterweight to balance the arm on an Acufex shoulder traction boom.    After confirming consent, side, and prophylactic antibiotics with an appropriate time-out, bony prominences were marked and standard diagnostic arthroscopy was initiated through a vertical stab incision at the posterior soft spot. Semi-blunt entry into the glenohumeral joint revealed worn but largely intact-appearing humeral head cartilage. Glenoid fossa also demonstrated some early chondromalacia and fibrillated cartilage. The biceps tendon appeared normal and anchored to the supraglenoid tubercle, although there was clear superior labral involvement. A spinal needle was used to localize an anterior interval portal.  Meticulous probing confirmed type 1 superior labral tearing anterior to posterior involving the superior labrum from approximately 10:30 to 2:30 for left shoulder. Meticulous debridement was carried out with a 4.0 synovial resector until a stable margin was achieved. The biceps-labral complex was stable at the tubercle, but the underneath section was debrided. The tendon was delivered into the joint and found to be intact without medial subluxation. Marching anteriorly, the subscapularis was intact. The anterior inferior glenohumeral ligament demonstrated fibrillated labral tearing, which was type 1, with chondromalacia at the chondrolabral junction. There may have been some mild peel-off at the anterior inferior labrum, which was likely posttraumatic in nature. Given no previous history of dislocation, no labral repair was carried out at this level. Gentle labral debridement was continued posteroinferiorly, where there was continued type 1 labral fraying. The global articular cartilage of the humeral head demonstrated grade 2 and small patches of grade 3 chondromalacia, but no full-thickness lesions. Primarily grade 2 changes were seen at the glenoid. Extensive debridement with chondroplasty was carried out throughout the articular surface.   Inspecting superiorly, there was a clear view into the subacromial space with complete detachment of the supraspinatus, infraspinatus, and perhaps a small portion of the teres minor. Subscapularis was intact. Undersurface of the rotator cuff tear was gently debrided with the shaver. An anterolateral portal was established and the joint was easily accessed through the cuff tear. Gentle debridement of the articular margin was performed from the articular view, especially posteriorly. At this point of the procedure the arthroscope was removed from the glenohumeral joint space after extensive debridement was completed. Attention was drawn to the rotator cuff. There was a massive retracted crescent with adequate cuff tendon mobility and quality. A cuff grasper was used and I was able to bring the crescent-shaped tear to the articular margin. Unfortunately, this was felt to be inadequate for complete coverage of the tuberosity. I therefore placed a traction stitch for cuff mobilization using blunt dissection on both the articular and bursal side of the supraspinous and infraspinatus. With some degree of effort, I was able to mobilize the cuff for better reduction to at least the mid-tuberosity level. Once I was confident that a primary repair would be achievable, we addressed the bony abnormalities in the subdeltoid space. There was a large anteromedial subacromial osteophyte which clearly was impinging the cuff. A subperiosteal dissection was carried out with the radiofrequency device until bony decompression could be carried out. A 4.0 barrel tip bur was used to resect approximately 6 to 7 mm of this prominence to convert the acromion to a type 1 configuration. This was adjacent to arthritic changes at the distal head of the clavicle which impinged in the myotendinous junction. Subacromial decompression was carried out using the lateral cutting block technique to involve the anterior third of the acromion to the midbody.   This was extended to include the [facility] joint, where the anterolateral portal was used to debride the inferior osteophytes at the distal head of the clavicle. The anterior portal was then used for circumferential bony debridement to about 8 mm from the posterior portal view. Circumferential debridement was carried out with radiofrequency ablation for both hemostasis and inferior clavicular exposure. Care was taken to preserve the posterior and superior AC joint capsule. Circumferential bony debridement was felt to be achieved. Hemostasis was confirmed. After completion of the distal claviculectomy and decompression, further debridement was required to expose a bleeding corticocancellous bed at the tuberosity. This included a small margin of articular cartilage which was irregular adjacent to the tendon footprint. This was extended to the lateral aspect of the tuberosity. I then selected an Arthrex 4.75 mm PEEK Corkscrew FT and placed it at the central anterior aspect of the supraspinous footprint. This was triple loaded. In a similar fashion, a second anchor was placed into adequate bone at the central posterior infraspinatus footprint and also triple loaded. Suture passage was then accommodated by the Scorpion device, where 6 horizontal mattress sutures were marched from posterior to anterior, covering the breadth of the infraspinatus with 3 sutures and in the supraspinous with 3 sutures. Sliding locking knots were tied, backed by alternating half-hitches. Cuff reduction was felt to be excellent at the articular margin. The infraspinatus tendon tissue was somewhat tenuous, however. Once all knots were tied, 5 arms of suture were extended off the posterolateral and 5 arms of suture were extended from the anterolateral cortex of the proximal humerus. A crisscrossing suture bridge configuration was achieved, compressing the lateral margin of the cuff into the footprint.   Adequate bone density was encountered and excellent fixation was felt to be achieved. An anatomic equivocal reduction was therefore created. Extraneous suture was cut flush to the lateral cortex of the proximal humerus after appropriate impaction and placement of the 3.5 mm PushLocks anterolaterally and posterolaterally. Once the cuff repair was felt to be complete, I elected to augment the repair with a Smith [employer] Nephew [employer] medium-sized bio-inductive collagen scaffold graft. This was based on the degree of tension, the size of the tear as well as the patient's attenuated infraspinatus tissue. A medium graft was therefore deployed with the insertion gun and fixed into place with 6 PDS staples in the central, medial, and lateral aspect to cover the repair. I was extremely satisfied with the coverage and final photographs were obtained. Once final photographs were obtained, wounds were reapproximated with 2-0 nylon in a simple fashion followed by application of a sterile nonadhesive dressing. This included povidone ointment, Adaptic, fluffs, ABDs, and Microfoam tape. The patient was placed in an abduction pillow and sling and awoken without complication. All sponge, needle, and instrument counts were correct. Dedicated assistance from [employer], was paramount for setup, manipulation of the arm, suture management as well as assistance during implantation of the hardware. She also performed wound closure and application of the dressing. All sponge, needle, and instrument counts were correct. Patient was taken to the postanesthesia recovery room in stable condition. Increased procedural service: The nature of this case was unusually difficult due to essentially 3 tendon involvement, significant medial retraction of the rotator cuff tear, the quality of the tendon tissue requiring a bio-inductive collagen scaffold graft, the need for 6 horizontal mattress sutures to reduce the multi-tendon tear.   We also encountered challenging visualization in a posttraumatic setting. Hemostasis was achievable using the radiofrequency ablator, but tissues were friable and it was a challenge through most of the case. This extended our total operative time to nearly 3 hours. Four implants were required, including two triple loaded anchors. The plan was to immobilize the patient for 4 to 6 weeks given the size and retracted nature of his tear.       Dictated By Tessa Bridges M.D.  d: 06/06/2023 10:48:14  t: 06/06/2023 14:31:22  [facility] [id number]  /

## 2023-06-06 NOTE — H&P
Orthopaedic H&P Update    H&P performed by Dr Isadora Macario on 5/19/23 was reviewed by Collette Charlton MD on 6/6/2023, the patient was examined and no significant changes have occurred in the patient's condition since the H&P was performed. Risks and benefits were discussed, proceed with procedure as planned.       Collette Charlton MD

## 2023-06-06 NOTE — ANESTHESIA PROCEDURE NOTES
Regional Block    Date/Time: 6/6/2023 7:04 AM    Performed by: Narciso Bailon MD  Authorized by: Narciso Bailon MD      General Information and Staff    Start Time:  6/6/2023 7:04 AM  End Time:  6/6/2023 7:07 AM  Anesthesiologist:  Ubaldo Finney MD  Performed by: Anesthesiologist  Patient Location:  OR    Block Placement: Pre Induction  Site Identification: real time ultrasound guided and image stored and retrievable    Block site/laterality marked before start: site marked  Reason for Block: at surgeon's request and post-op pain management    Preanesthetic Checklist: 2 patient identifers, IV checked, site marked, risks and benefits discussed, monitors and equipment checked, pre-op evaluation, timeout performed, anesthesia consent, sterile technique used, no prohibitive neurological deficits and no local skin infection at insertion site      Procedure Details    Patient Position:  Supine  Prep: ChloraPrep    Monitoring:  Cardiac monitor, continuous pulse ox and blood pressure cuff  Laterality:  Left  Injection Technique:  Single-shot    Needle    Needle Type:  Short-bevel and echogenic  Needle Gauge:  20 G  Needle Length:  50 mm  Needle Localization:  Ultrasound guidance  Reason for Ultrasound Use: appropriate spread of the medication was noted in real time and no ultrasound evidence of intravascular and/or intraneural injection            Assessment    Injection Assessment:  Good spread noted, negative resistance, negative aspiration for heme, incremental injection and low pressure  Heart Rate Change: No    - Patient tolerated block procedure well without evidence of immediate block related complications.      Medications  6/6/2023 7:04 AM      Additional Comments    Medication:  Bupivacaine 0.375% 20mL with PF dexamethasone 2mg and buprenorphine 150mcg

## 2023-06-14 ENCOUNTER — OFFICE VISIT (OUTPATIENT)
Dept: ORTHOPEDICS CLINIC | Facility: CLINIC | Age: 78
End: 2023-06-14
Payer: MEDICARE

## 2023-06-14 DIAGNOSIS — Z48.89 AFTERCARE FOLLOWING SURGERY: Primary | ICD-10-CM

## 2023-06-14 DIAGNOSIS — S46.012D TRAUMATIC COMPLETE TEAR OF LEFT ROTATOR CUFF, SUBSEQUENT ENCOUNTER: ICD-10-CM

## 2023-06-14 PROCEDURE — 99024 POSTOP FOLLOW-UP VISIT: CPT | Performed by: ORTHOPAEDIC SURGERY

## 2023-06-14 PROCEDURE — 1125F AMNT PAIN NOTED PAIN PRSNT: CPT | Performed by: ORTHOPAEDIC SURGERY

## 2023-06-14 PROCEDURE — 1159F MED LIST DOCD IN RCRD: CPT | Performed by: ORTHOPAEDIC SURGERY

## 2023-06-14 PROCEDURE — 1160F RVW MEDS BY RX/DR IN RCRD: CPT | Performed by: ORTHOPAEDIC SURGERY

## 2023-06-14 RX ORDER — ACETAMINOPHEN AND CODEINE PHOSPHATE 300; 30 MG/1; MG/1
TABLET ORAL AS DIRECTED
COMMUNITY

## 2023-06-14 NOTE — PROGRESS NOTES
[facility] Post-op Progress Note        Date of Surgery:  6/6/2023        History: The patient is a [de-identified]year old male status post shoulder arthroscopy and rotator cuff repair approximately 10 days ago. Patient reports no complications following anesthesia and good tolerance of the sling and pillow. The patient reports adequate pain control with oral analgesics. Physical Exam: On examination, arthroscopic portals are healing uneventfully and sutures are removed. Moderate swelling and tracking ecchymosis is noted with no significant erythema. Hand, wrist and elbow range of motion is within normal limits with only mild stiffness at the elbow. Pendulum is well-tolerated to approximately 70 degrees. Neurovascular status is intact distally at the hand and wrist.     Assessment: Status post right shoulder arthroscopy with rotator cuff repair, subacromial decompression, distal claviculectomy and debridement     Plan: I reviewed intraoperative findings and photographs with the patient. There was a massive full-thickness rotator cuff tear which was successfully repaired. Subacromial decompression and distal claviculectomy with appropriate debridement was also carried out. Continued sling and abduction pillow protection was reviewed. Pillow may be discontinued in about 2 weeks and the sling should be used as needed for 2 additional weeks thereafter. This should include any public or uncontrolled environments, as well as during sleep. Physical therapy prescription is also provided today focusing on gentle passive and active assisted range of motion per our rotator cuff protocol. Distal clavicle precautions will restrict cross-arm adduction and internal rotation for 6 weeks postop. Thereafter, range of motion may progress as tolerated. Follow-up is recommended in 2-3 weeks or sooner as needed. All questions were answered and the patient expressed understanding. Kate Rapp Ma, MD, [place] [address] Medicine/Knee and Shoulder  THE [facility] Department of Orthopaedics  Phone [phone]  Fax [phone]      This document was partially prepared using [employer] voice recognition software.   Although every attempt is made to correct errors during dictation, discrepancies may still exist.

## 2023-06-16 ENCOUNTER — TELEPHONE (OUTPATIENT)
Dept: ORTHOPEDICS CLINIC | Facility: CLINIC | Age: 78
End: 2023-06-16

## 2023-06-16 DIAGNOSIS — Z48.89 AFTERCARE FOLLOWING SURGERY: Primary | ICD-10-CM

## 2023-06-16 NOTE — TELEPHONE ENCOUNTER
Patient is asking for a external Physical therapy order to be sent to:    [facility] [address] [place] [address]    P: [phone]   F: [phone]

## 2023-06-19 ENCOUNTER — MED REC SCAN ONLY (OUTPATIENT)
Dept: ORTHOPEDICS CLINIC | Facility: CLINIC | Age: 78
End: 2023-06-19

## 2023-06-28 ENCOUNTER — OFFICE VISIT (OUTPATIENT)
Dept: ORTHOPEDICS CLINIC | Facility: CLINIC | Age: 78
End: 2023-06-28
Payer: MEDICARE

## 2023-06-28 VITALS — WEIGHT: 160 LBS | HEIGHT: 72 IN | OXYGEN SATURATION: 96 % | HEART RATE: 75 BPM | BODY MASS INDEX: 21.67 KG/M2

## 2023-06-28 DIAGNOSIS — Z98.890 STATUS POST ROTATOR CUFF REPAIR: ICD-10-CM

## 2023-06-28 DIAGNOSIS — Z48.89 AFTERCARE FOLLOWING SURGERY: Primary | ICD-10-CM

## 2023-06-28 PROCEDURE — 1159F MED LIST DOCD IN RCRD: CPT | Performed by: ORTHOPAEDIC SURGERY

## 2023-06-28 PROCEDURE — 99024 POSTOP FOLLOW-UP VISIT: CPT | Performed by: ORTHOPAEDIC SURGERY

## 2023-06-28 PROCEDURE — 1160F RVW MEDS BY RX/DR IN RCRD: CPT | Performed by: ORTHOPAEDIC SURGERY

## 2023-06-28 PROCEDURE — 3008F BODY MASS INDEX DOCD: CPT | Performed by: ORTHOPAEDIC SURGERY

## 2023-06-29 ENCOUNTER — TELEPHONE (OUTPATIENT)
Dept: ORTHOPEDICS CLINIC | Facility: CLINIC | Age: 78
End: 2023-06-29

## 2023-06-29 ENCOUNTER — MED REC SCAN ONLY (OUTPATIENT)
Dept: ORTHOPEDICS CLINIC | Facility: CLINIC | Age: 78
End: 2023-06-29

## 2023-06-29 DIAGNOSIS — Z48.89 AFTERCARE FOLLOWING SURGERY: ICD-10-CM

## 2023-06-29 DIAGNOSIS — Z98.890 STATUS POST ROTATOR CUFF REPAIR: Primary | ICD-10-CM

## 2023-07-11 ENCOUNTER — MED REC SCAN ONLY (OUTPATIENT)
Dept: ORTHOPEDICS CLINIC | Facility: CLINIC | Age: 78
End: 2023-07-11

## 2023-08-02 ENCOUNTER — OFFICE VISIT (OUTPATIENT)
Dept: ORTHOPEDICS CLINIC | Facility: CLINIC | Age: 78
End: 2023-08-02
Payer: MEDICARE

## 2023-08-02 VITALS — WEIGHT: 160 LBS | BODY MASS INDEX: 21.67 KG/M2 | HEIGHT: 72 IN

## 2023-08-02 DIAGNOSIS — Z98.890 STATUS POST ROTATOR CUFF REPAIR: ICD-10-CM

## 2023-08-02 DIAGNOSIS — Z48.89 AFTERCARE FOLLOWING SURGERY: Primary | ICD-10-CM

## 2023-08-02 PROCEDURE — 99024 POSTOP FOLLOW-UP VISIT: CPT | Performed by: PHYSICIAN ASSISTANT

## 2023-08-02 PROCEDURE — 1160F RVW MEDS BY RX/DR IN RCRD: CPT | Performed by: PHYSICIAN ASSISTANT

## 2023-08-02 PROCEDURE — 3008F BODY MASS INDEX DOCD: CPT | Performed by: PHYSICIAN ASSISTANT

## 2023-08-02 PROCEDURE — 1159F MED LIST DOCD IN RCRD: CPT | Performed by: PHYSICIAN ASSISTANT

## 2023-08-02 PROCEDURE — 1126F AMNT PAIN NOTED NONE PRSNT: CPT | Performed by: PHYSICIAN ASSISTANT

## 2023-08-02 NOTE — PROGRESS NOTES
[facility] Post Op Progress Note    Date of Surgery: 06/06/2023       Subjective: Kobi Okeefe is a [de-identified]year old male who is here for follow-up of his left shoulder. He underwent left shoulder arthroscopy with massive repair of a rotator cuff tear by Dr. Redd Ibarra approximately 2 months ago. He has tolerated formal therapy and stretching on his own. He notes stiffness at the terminal end but pain has significantly improved. He did note some itching at the posterior portal but otherwise no complaints. Objective: Exam the left shoulder and upper extremity reveals that the portals are well-healed. He does have a scab near the posterior portal but no erythema, drainage. He has forward elevation to 140 degrees. External rotation is symmetric to 25 degrees. Internal rotation to the lower lumbar spine. Sensation is present to light touch. Assessment: Status post left shoulder arthroscopy with rotator cuff repair massive retracted tear with bio inductive collagen graft, decompression, distal clavicle excision and debridement      Plan: Overall he is progressing well. He will continue working in therapy and at home to reach terminal range of motion. He will incorporate strengthening in 4 weeks, but the goal is full range of motion prior to that point. A new PT prescription was given. He will continue to avoid lifting or reaching away from his body. He will avoid golfing activities for now except for some early chipping and putting. He will follow-up in 1 month for recheck or sooner with questions, concerns, or worsening symptoms. A student was present during the visit after the patient's consent.       Luisa Godfrey, IZABELLA, PA-C Orthopedic Surgery   [facility]  [address], [address]   t: [phone]  f: [phone]

## 2023-08-14 ENCOUNTER — MED REC SCAN ONLY (OUTPATIENT)
Dept: ORTHOPEDICS CLINIC | Facility: CLINIC | Age: 78
End: 2023-08-14

## 2023-09-18 ENCOUNTER — OFFICE VISIT (OUTPATIENT)
Dept: ORTHOPEDICS CLINIC | Facility: CLINIC | Age: 78
End: 2023-09-18
Payer: MEDICARE

## 2023-09-18 VITALS — BODY MASS INDEX: 21.67 KG/M2 | WEIGHT: 160 LBS | HEIGHT: 72 IN

## 2023-09-18 DIAGNOSIS — Z09 FOLLOW-UP EXAMINATION, FOLLOWING OTHER SURGERY: ICD-10-CM

## 2023-09-18 DIAGNOSIS — Z98.890 STATUS POST ROTATOR CUFF REPAIR: Primary | ICD-10-CM

## 2023-09-18 PROCEDURE — 3008F BODY MASS INDEX DOCD: CPT | Performed by: ORTHOPAEDIC SURGERY

## 2023-09-18 PROCEDURE — 99213 OFFICE O/P EST LOW 20 MIN: CPT | Performed by: ORTHOPAEDIC SURGERY

## 2023-09-18 PROCEDURE — 1126F AMNT PAIN NOTED NONE PRSNT: CPT | Performed by: ORTHOPAEDIC SURGERY

## 2023-09-18 PROCEDURE — 1160F RVW MEDS BY RX/DR IN RCRD: CPT | Performed by: ORTHOPAEDIC SURGERY

## 2023-09-18 PROCEDURE — 1159F MED LIST DOCD IN RCRD: CPT | Performed by: ORTHOPAEDIC SURGERY

## 2023-09-25 ENCOUNTER — MED REC SCAN ONLY (OUTPATIENT)
Dept: ORTHOPEDICS CLINIC | Facility: CLINIC | Age: 78
End: 2023-09-25

## 2023-10-12 ENCOUNTER — TELEPHONE (OUTPATIENT)
Dept: ORTHOPEDICS CLINIC | Facility: CLINIC | Age: 78
End: 2023-10-12

## 2023-10-12 DIAGNOSIS — M54.50 LOW BACK PAIN, UNSPECIFIED BACK PAIN LATERALITY, UNSPECIFIED CHRONICITY, UNSPECIFIED WHETHER SCIATICA PRESENT: Primary | ICD-10-CM

## 2023-10-12 NOTE — TELEPHONE ENCOUNTER
Future Appointments   Date Time Provider Department Center   10/17/2023 11:20 AM Suhail Carcamo MD [facility]       This patient is coming for Lower back arthritis. No prior imaging done yet. Please advise if views are needed for this appt. Thanks.      Patient may be reached at [phone]

## 2023-10-23 ENCOUNTER — TELEPHONE (OUTPATIENT)
Dept: ORTHOPEDICS CLINIC | Facility: CLINIC | Age: 78
End: 2023-10-23

## 2023-10-23 DIAGNOSIS — Z48.89 AFTERCARE FOLLOWING SURGERY: Primary | ICD-10-CM

## 2023-10-23 DIAGNOSIS — Z98.890 STATUS POST ROTATOR CUFF REPAIR: ICD-10-CM

## 2023-10-23 NOTE — TELEPHONE ENCOUNTER
Spoke with patient to let him know the new PT order is ready. Pt stated he just got back from [place] and is not sure where he is going to go yet. He will research this week and let us know where to fa the order. No other questions or concerns.

## 2023-10-23 NOTE — TELEPHONE ENCOUNTER
Patient wants a new PT referral order due to the current one expiring 10/31/23. Please review/revise pended PT order. Thank you! clothing/cell phone/purse

## (undated) DEVICE — TUBING IRR 16FT CNT WV 3 ASCP

## (undated) DEVICE — [STANDARD 12-FLUTE BARREL BUR, ARTHROSCOPIC SHAVER BLADE,  DO NOT RESTERILIZE,  DO NOT USE IF PACKAGE IS DAMAGED,  KEEP DRY,  KEEP AWAY FROM SUNLIGHT]: Brand: FORMULA

## (undated) DEVICE — 3M™ IOBAN™ 2 ANTIMICROBIAL INCISE DRAPE 6648EZ: Brand: IOBAN™ 2

## (undated) DEVICE — STERILE POLYISOPRENE POWDER-FREE SURGICAL GLOVES: Brand: PROTEXIS

## (undated) DEVICE — MEDI-VAC NON-CONDUCTIVE SUCTION TUBING: Brand: CARDINAL HEALTH

## (undated) DEVICE — DUAL SPIKE ADAPTER: Brand: CONMED

## (undated) DEVICE — SHOULDER ARTHROSCOPY CDS-LF: Brand: MEDLINE INDUSTRIES, INC.

## (undated) DEVICE — SHEET,DRAPE,70X100,STERILE: Brand: MEDLINE

## (undated) DEVICE — [AGGRESSIVE PLUS CUTTER, ARTHROSCOPIC SHAVER BLADE,  DO NOT RESTERILIZE,  DO NOT USE IF PACKAGE IS DAMAGED,  KEEP DRY,  KEEP AWAY FROM SUNLIGHT]: Brand: FORMULA

## (undated) DEVICE — SUT SUTURETAPE 1/2 AR-7506

## (undated) DEVICE — COVER LIGHT HANDLE RIGID GREEN

## (undated) DEVICE — APPLICATOR CHLORAPREP 26ML

## (undated) DEVICE — CANNULA 6MM TWIST AR-6535

## (undated) DEVICE — SHOULDER TRACTION KIT AR-1635

## (undated) DEVICE — DRAPE,U/SHT,SPLIT,FILM,60X84,STERILE: Brand: MEDLINE

## (undated) DEVICE — STERILE POLYISOPRENE POWDER-FREE SURGICAL GLOVES WITH EMOLLIENT COATING: Brand: PROTEXIS

## (undated) DEVICE — GAUZE,SPONGE,FLUFF,6"X6.75",STRL,5/TRAY: Brand: MEDLINE

## (undated) DEVICE — 3M™ STERI-DRAPE™ U-DRAPE 1015: Brand: STERI-DRAPE™

## (undated) DEVICE — NEEDLE SCORPION AR-13995N

## (undated) DEVICE — SOL LACT RINGERS 3000ML

## (undated) DEVICE — SUPER TURBOVAC 90 IFS: Brand: COBLATION

## (undated) DEVICE — CANNULA 7MM TWIST AR-6570

## (undated) DEVICE — PAD,ABDOMINAL,8"X7.5",ST,LF,20/BX: Brand: MEDLINE INDUSTRIES, INC.

## (undated) DEVICE — GOWN AERO CHROME XXL

## (undated) DEVICE — SLEEVE KENDALL SCD EXPRESS MED

## (undated) DEVICE — CURAD OIL EMLUSION GAUZE 3X16

## (undated) DEVICE — SUT ETHILON 2-0 FS 664H

## (undated) DEVICE — COVER,MAYO STAND,STERILE: Brand: MEDLINE

## (undated) NOTE — LETTER
23  [facility]   Pre-Operative Clearance Request    Patient Name:   Lillian Mosquera             :   1945    Surgeon: Dr. Cassius Carpio             Date of Surgery: 23    Surgical Procedure: LEFT SHOULDER ARTHROSCOPY ROTATOR CUFF REPAIR, POSSIBLE REGENETEN BIO INDUCTIVE GRAFT, SUBACROMIAL DECOMPRESSION, DEBRIDEMENT, POSSIBLE DISTAL CLAVICULECTOMY. Please complete all of the following 2-3 weeks PRIOR TO your scheduled surgery to avoid potential cancellation. [x]  History and Physical        [x]  Medical  Clearance                       Required pre-op testing to be ordered:                          **Please fax test results, H&P, and clearance to [phone] and to [facility] at [phone]**